# Patient Record
Sex: FEMALE | Race: WHITE | NOT HISPANIC OR LATINO | Employment: PART TIME
[De-identification: names, ages, dates, MRNs, and addresses within clinical notes are randomized per-mention and may not be internally consistent; named-entity substitution may affect disease eponyms.]

---

## 2021-05-13 ENCOUNTER — TRANSCRIBE ORDERS (OUTPATIENT)
Dept: OTHER | Age: 51
End: 2021-05-13

## 2021-05-13 DIAGNOSIS — R74.8 ELEVATED LIVER ENZYMES: Primary | ICD-10-CM

## 2022-08-13 NOTE — H&P (VIEW-ONLY)
GYNECOLOGIC ONCOLOGY CONSULT    Patient Name: DIEGO MARCUS Patient : 1970   Patient MRN: [id number]    Referring Physician: Jian Bolivar MD   Primary GYNOncologist: Ruth Christine (Gynecological/Oncology)  Date of Service: 2022     Reason for Consult:  Treatment recommendations    History of Present Illness (Gyn Oncology):  Diego Marcus is a 52 year old female with a recent history of postmenopausal bleeding who was identified to have an 18 cm solid and cystic right ovarian mass, in addition to severe endometrial thickening. She presents today for treatment recommendations.    Clinical course:  Patient has a history of a left breast cancer (invasive ductal carcinoma) that was treated with partial mastectomy and breast reductions in .  22: Pelvic ultrasound showed highly suspicious right ovarian mass measuring 16.4 x 14 x 10.51 m with abdominal ascites present. Endometrial stripe measures 15 mm.  22: Diego presented for a consult for heavy menstrual bleeding ongoing for approximately 3 months. Her bleeding went on a daily basis for a while. She reported that her regular menstrual cycles ended 2 years prior and has been having random episodes of vaginal bleeding since that time. She has a history of a left breast cancer (invasive ductal carcinoma) that was treated with partial mastectomy and breast reductions.  22:  = 2192.   22: CT scan of the chest, abdomen, and pelvis showed a large 18 cm solid and cystic right ovarian mass, likely cystic ovarian neoplasm. Severe endometrial thickening, either endometrial hyperplasia related to hormonally active ovarian neoplasm versus synchronous malignancy. Small volume ascites. Malignant ascites not excluded. No peritoneal implants identified. No evidence of metastatic disease in the chest.  Genetic Testing (Gyn Oncology):  N/A    Interval History (Gyn Oncology):  Diego presents today for treatment recommendations. She reports her  periods stopped 2 years ago and she started to experience abnormal vaginal bleeding approximately 3-4 months ago. She noted gushing bleeding almost on a daily basis on her pad. Her current vaginal bleeding is intermittent. She also has abdominal bloating. She has bilateral feet and ankle swelling. She has intermittent constipation with her iron supplements. She has a history of liver cirrhosis and has quit drinking since her diagnosis approximately 3 months ago.       Review of Systems:  A complete 14-point review of systems is negative except as noted in the above history of present illness.    Past Medical History:  Menorrhagia  Elevated cholesterol  Left breast cancer (Invasive ductal carcinoma) 2015  Hypertension  Insomnia  Ovarian cyst  Alcoholic cirrhosis of the liver  Pain in joint, involving multiple sites  Tear of rotator cuff  UTI  Rashes due to different topicals    Surgical History:  Partial mastectomy of the left breast 2015  Bilateral breast reductions    x2 in  and   Abdominoplasty    OB/Gyn History:  , C-sections x2, SAB x1  Menarche age: 15  LMP: 2020  Last mammogram on 5/10/2019. Normal  No colonoscopies    Allergies#  No known medication allergies    Medications:  Colace (Docusate Sodium Oral)  Pimecrolimus Topical Cream 1 %  Minocycline Oral 100 mg tablet  Ferrous Sulfate Oral 325 mg (65 mg iron) tablet  Metronidazole Topical Gel 1 %  Multivitamins W-Minerals Oral Tablet  Claritin (Loratadine Oral)  Restoril (Temazepam Oral) 15 mg capsule  Vistaril (Hydroxyzine Pamoate Oral) 25 mg capsule  Family History:  Esophageal cancer - Father,  in late 40s.    Social History:    Former smoker. Only smoked for a couple of years in the s and then quit  Current alcohol use: 3-5 times per week, 2 drinks /episode on average  No drug use    Health Maintenance:    Vital Signs:  Blood pressure: 126/85, Pulse: 106, Temperature: 98.1 F, Respirations: 20, O2 sat:  99%, Pain Scale: 3, Height: 61.5 in, Weight: 148 lb, BSA: 1.67, BMI: 27.51 kg/m2     Physical Exam (Gyn Oncology):  General: Alert and oriented x3, no acute distress.  HEENT: Normocephalic, atraumatic.  Lymph node exam: No supraclavicular, submandibular, or cervical lymphadenopathy.  CV: Regular rate and rhythm, no murmurs, rubs or gallops.  Resp: Clear to auscultation bilaterally, no wheezes, rales, or rhonchi.  Abdomen: Soft non-tender to palpation, significantly distended with a palpable abdominal mass, extending to at least the patient's umbilicus. The mass is relatively immobile.  Lower Extremity Exam: 2+ bilateral pedal edema.  Neuro: Cranial Nerves 2-12 intact, gross motor skills intact.  Genitourinary exam: declined/refused by patient    Laboratory Data:        Imagin22: US Pelvis  IMPRESSION:  Highly suspicious right ovarian mass measuring 16.4 x 14 x 10.51 m with abdominal ascites present. Endometrial stripe measures 15 mm.    22: CT CAP  IMPRESSION:  A large 18 cm solid and cystic right ovarian mass, likely cystic ovarian neoplasm. Severe endometrial thickening, either endometrial hyperplasia related to hormonally active ovarian neoplasm versus synchronous malignancy. Small volume ascites. Malignant ascites not excluded. No peritoneal implants identified. No evidence of metastatic disease in the chest.    Problems:  Primary malignant neoplasm of female breast (disorder) ( Stage Date: 10/12/2015, Stage IIA (Primary, Left breast overlapping sites, T2, pN0, M0, G2, ER Status: Positive, MT Status: Positive)  Date of Dx:2015 Extent of Disease: Evidence of local disease; Histopathologic Type: Ductal invasive carcinoma; Lymph-Vascular Invasion: Not present; Oncotype DX Result-Invasive Breast Cancer: Intermediate risk; Menopausal Status: Premenopausal; First record:10/12/2015 Last record:2016 Other Migrated ICD10: C50.412 , C50.812 )  Adnexal mass  Estrogen receptor positive status [ER+]  ( G1 icd10 w/o problem, last charted: 01-; )  Assessment & Plan (Gyn Oncology):  Radha Marcus is a 52 year old female with a recent history of postmenopausal bleeding who was identified to have an 18 cm solid and cystic right ovarian mass, in addition to severe endometrial thickening. She presents today for treatment recommendations.    1. 18 cm complex right ovarian mass - I reviewed with Radha and her  the concerning features of the right ovarian mass, including the complex features, the associated small volume ascites, and the markedly elevated CA-125 tumor marker greater >2000 u/mL. We reviewed the need to remove the ovarian mass and perform frozen section analysis. With the endometrial thickening listed below and the elevated tumor marker, there is an increased probability that we will uncover an abnormal process at the time of surgery. Therefore, I do think it is unreasonable to proceed with a hysterectomy and bilateral salpingo-oophorectomy. We will potentially be able to proceed robotically, depending on intraoperative findings on diagnostic laparoscopy. We may perform a robotic-assisted total laparoscopic hysterectomy, bilateral salpingo-oophorectomy, and possible staging. We may instead, however, need to proceed with an open procedure due to the sheer size of the mass, the new bilateral pedal edema. I reviewed with Radha the need to not disrupt the cystic mass within the abdomen and the potential need to proceed with a midline laparotomy. We reviewed the risks of surgery that do include bleeding or infection, potential damage to surrounding structures. We reviewed general perioperative expectations and the need for a preoperative history and physical and a COVID-19 test prior to surgery.    2. Endometrial thickening - Radha's endometrial lining was 15 mm on the initial ultrasound and with her recent history of abnormal uterine bleeding, I have recommended we proceed with an endometrial  biopsy. Radha has a great fear of pain and is not comfortable undergoing an endometrial biopsy in the office today. Due to the relatively urgent nature of the procedure, I have recommended instead that we proceed with a hysterectomy and possible staging, depending on intraoperative findings.    3. History of cirrhosis - Radha is now 3 months clean for her history of alcoholism. We will need to obtain labs to determine her albumin level, her liver function test to know her general perioperative risks. Preoperative risk assessment and management is necessary.     Thank you very much for allowing me to take part in the care of this very sweet patient.    3 minutes in reviewing records, 40 minutes in discussion, 2 minutes ordering labs.    Pain Care Management:  Pain Scale: 3    Patient Care needs:  Depressions Status: Was screened; Outcome positive: No; Screening Date: 08/11/2022; Screening Tool: PRIME MD-PHQ2; Total depression score: 0  Smoking Status: Former smoker  Documentation assistance provided by Brad Suárez, scribing for Dr. Ruth Christine, on 8/11/2022.  I, Dr. Ruth Christine, personally performed the services described in this documentation, and it is both accurate and complete.     Ruth Christine MD  Phone: [phone]  Fax: [phone]

## 2022-08-13 NOTE — CONSULTS
GYNECOLOGIC ONCOLOGY CONSULT    Patient Name: DIEGO MARCUS Patient : 1970   Patient MRN: [id number]    Referring Physician: Jian Bolivar MD   Primary GYNOncologist: Ruth Christine (Gynecological/Oncology)  Date of Service: 2022     Reason for Consult:  Treatment recommendations    History of Present Illness (Gyn Oncology):  Diego Marcus is a 52 year old female with a recent history of postmenopausal bleeding who was identified to have an 18 cm solid and cystic right ovarian mass, in addition to severe endometrial thickening. She presents today for treatment recommendations.    Clinical course:  Patient has a history of a left breast cancer (invasive ductal carcinoma) that was treated with partial mastectomy and breast reductions in .  22: Pelvic ultrasound showed highly suspicious right ovarian mass measuring 16.4 x 14 x 10.51 m with abdominal ascites present. Endometrial stripe measures 15 mm.  22: Diego presented for a consult for heavy menstrual bleeding ongoing for approximately 3 months. Her bleeding went on a daily basis for a while. She reported that her regular menstrual cycles ended 2 years prior and has been having random episodes of vaginal bleeding since that time. She has a history of a left breast cancer (invasive ductal carcinoma) that was treated with partial mastectomy and breast reductions.  22:  = 2192.   22: CT scan of the chest, abdomen, and pelvis showed a large 18 cm solid and cystic right ovarian mass, likely cystic ovarian neoplasm. Severe endometrial thickening, either endometrial hyperplasia related to hormonally active ovarian neoplasm versus synchronous malignancy. Small volume ascites. Malignant ascites not excluded. No peritoneal implants identified. No evidence of metastatic disease in the chest.  Genetic Testing (Gyn Oncology):  N/A    Interval History (Gyn Oncology):  Diego presents today for treatment recommendations. She reports her  periods stopped 2 years ago and she started to experience abnormal vaginal bleeding approximately 3-4 months ago. She noted gushing bleeding almost on a daily basis on her pad. Her current vaginal bleeding is intermittent. She also has abdominal bloating. She has bilateral feet and ankle swelling. She has intermittent constipation with her iron supplements. She has a history of liver cirrhosis and has quit drinking since her diagnosis approximately 3 months ago.       Review of Systems:  A complete 14-point review of systems is negative except as noted in the above history of present illness.    Past Medical History:  Menorrhagia  Elevated cholesterol  Left breast cancer (Invasive ductal carcinoma) 2015  Hypertension  Insomnia  Ovarian cyst  Alcoholic cirrhosis of the liver  Pain in joint, involving multiple sites  Tear of rotator cuff  UTI  Rashes due to different topicals    Surgical History:  Partial mastectomy of the left breast 2015  Bilateral breast reductions    x2 in  and   Abdominoplasty    OB/Gyn History:  , C-sections x2, SAB x1  Menarche age: 15  LMP: 2020  Last mammogram on 5/10/2019. Normal  No colonoscopies    Allergies#  No known medication allergies    Medications:  Colace (Docusate Sodium Oral)  Pimecrolimus Topical Cream 1 %  Minocycline Oral 100 mg tablet  Ferrous Sulfate Oral 325 mg (65 mg iron) tablet  Metronidazole Topical Gel 1 %  Multivitamins W-Minerals Oral Tablet  Claritin (Loratadine Oral)  Restoril (Temazepam Oral) 15 mg capsule  Vistaril (Hydroxyzine Pamoate Oral) 25 mg capsule  Family History:  Esophageal cancer - Father,  in late 40s.    Social History:    Former smoker. Only smoked for a couple of years in the s and then quit  Current alcohol use: 3-5 times per week, 2 drinks /episode on average  No drug use    Health Maintenance:    Vital Signs:  Blood pressure: 126/85, Pulse: 106, Temperature: 98.1 F, Respirations: 20, O2 sat:  99%, Pain Scale: 3, Height: 61.5 in, Weight: 148 lb, BSA: 1.67, BMI: 27.51 kg/m2     Physical Exam (Gyn Oncology):  General: Alert and oriented x3, no acute distress.  HEENT: Normocephalic, atraumatic.  Lymph node exam: No supraclavicular, submandibular, or cervical lymphadenopathy.  CV: Regular rate and rhythm, no murmurs, rubs or gallops.  Resp: Clear to auscultation bilaterally, no wheezes, rales, or rhonchi.  Abdomen: Soft non-tender to palpation, significantly distended with a palpable abdominal mass, extending to at least the patient's umbilicus. The mass is relatively immobile.  Lower Extremity Exam: 2+ bilateral pedal edema.  Neuro: Cranial Nerves 2-12 intact, gross motor skills intact.  Genitourinary exam: declined/refused by patient    Laboratory Data:        Imagin22: US Pelvis  IMPRESSION:  Highly suspicious right ovarian mass measuring 16.4 x 14 x 10.51 m with abdominal ascites present. Endometrial stripe measures 15 mm.    22: CT CAP  IMPRESSION:  A large 18 cm solid and cystic right ovarian mass, likely cystic ovarian neoplasm. Severe endometrial thickening, either endometrial hyperplasia related to hormonally active ovarian neoplasm versus synchronous malignancy. Small volume ascites. Malignant ascites not excluded. No peritoneal implants identified. No evidence of metastatic disease in the chest.    Problems:  Primary malignant neoplasm of female breast (disorder) ( Stage Date: 10/12/2015, Stage IIA (Primary, Left breast overlapping sites, T2, pN0, M0, G2, ER Status: Positive, MT Status: Positive)  Date of Dx:2015 Extent of Disease: Evidence of local disease; Histopathologic Type: Ductal invasive carcinoma; Lymph-Vascular Invasion: Not present; Oncotype DX Result-Invasive Breast Cancer: Intermediate risk; Menopausal Status: Premenopausal; First record:10/12/2015 Last record:2016 Other Migrated ICD10: C50.412 , C50.812 )  Adnexal mass  Estrogen receptor positive status [ER+]  ( G1 icd10 w/o problem, last charted: 01-; )  Assessment & Plan (Gyn Oncology):  Radha Marcus is a 52 year old female with a recent history of postmenopausal bleeding who was identified to have an 18 cm solid and cystic right ovarian mass, in addition to severe endometrial thickening. She presents today for treatment recommendations.    1. 18 cm complex right ovarian mass - I reviewed with Radha and her  the concerning features of the right ovarian mass, including the complex features, the associated small volume ascites, and the markedly elevated CA-125 tumor marker greater >2000 u/mL. We reviewed the need to remove the ovarian mass and perform frozen section analysis. With the endometrial thickening listed below and the elevated tumor marker, there is an increased probability that we will uncover an abnormal process at the time of surgery. Therefore, I do think it is unreasonable to proceed with a hysterectomy and bilateral salpingo-oophorectomy. We will potentially be able to proceed robotically, depending on intraoperative findings on diagnostic laparoscopy. We may perform a robotic-assisted total laparoscopic hysterectomy, bilateral salpingo-oophorectomy, and possible staging. We may instead, however, need to proceed with an open procedure due to the sheer size of the mass, the new bilateral pedal edema. I reviewed with Radha the need to not disrupt the cystic mass within the abdomen and the potential need to proceed with a midline laparotomy. We reviewed the risks of surgery that do include bleeding or infection, potential damage to surrounding structures. We reviewed general perioperative expectations and the need for a preoperative history and physical and a COVID-19 test prior to surgery.    2. Endometrial thickening - Radha's endometrial lining was 15 mm on the initial ultrasound and with her recent history of abnormal uterine bleeding, I have recommended we proceed with an endometrial  biopsy. Radha has a great fear of pain and is not comfortable undergoing an endometrial biopsy in the office today. Due to the relatively urgent nature of the procedure, I have recommended instead that we proceed with a hysterectomy and possible staging, depending on intraoperative findings.    3. History of cirrhosis - Radha is now 3 months clean for her history of alcoholism. We will need to obtain labs to determine her albumin level, her liver function test to know her general perioperative risks. Preoperative risk assessment and management is necessary.     Thank you very much for allowing me to take part in the care of this very sweet patient.    3 minutes in reviewing records, 40 minutes in discussion, 2 minutes ordering labs.    Pain Care Management:  Pain Scale: 3    Patient Care needs:  Depressions Status: Was screened; Outcome positive: No; Screening Date: 08/11/2022; Screening Tool: PRIME MD-PHQ2; Total depression score: 0  Smoking Status: Former smoker  Documentation assistance provided by Brad Suárez, scribing for Dr. Ruth Christine, on 8/11/2022.  I, Dr. Ruth Christine, personally performed the services described in this documentation, and it is both accurate and complete.     Ruth Christine MD  Phone: [phone]  Fax: [phone]

## 2022-08-15 RX ORDER — MINOCYCLINE HYDROCHLORIDE 100 MG/1
100 CAPSULE ORAL 2 TIMES DAILY
COMMUNITY

## 2022-08-15 RX ORDER — HYDROXYZINE HYDROCHLORIDE 25 MG/1
25 TABLET, FILM COATED ORAL 3 TIMES DAILY PRN
Status: ON HOLD | COMMUNITY
End: 2022-08-16

## 2022-08-15 RX ORDER — HYDROCODONE BITARTRATE AND ACETAMINOPHEN 5; 325 MG/1; MG/1
1 TABLET ORAL EVERY 6 HOURS PRN
Status: ON HOLD | COMMUNITY
End: 2022-08-16

## 2022-08-15 RX ORDER — FERROUS SULFATE 324(65)MG
TABLET, DELAYED RELEASE (ENTERIC COATED) ORAL
Status: ON HOLD | COMMUNITY
End: 2022-08-16 | Stop reason: DRUGHIGH

## 2022-08-16 ENCOUNTER — ANCILLARY PROCEDURE (OUTPATIENT)
Dept: ULTRASOUND IMAGING | Facility: HOSPITAL | Age: 52
End: 2022-08-16
Attending: ANESTHESIOLOGY
Payer: COMMERCIAL

## 2022-08-16 ENCOUNTER — ANESTHESIA EVENT (OUTPATIENT)
Dept: SURGERY | Facility: HOSPITAL | Age: 52
End: 2022-08-16
Payer: COMMERCIAL

## 2022-08-16 ENCOUNTER — ANESTHESIA (OUTPATIENT)
Dept: SURGERY | Facility: HOSPITAL | Age: 52
End: 2022-08-16
Payer: COMMERCIAL

## 2022-08-16 ENCOUNTER — HOSPITAL ENCOUNTER (OUTPATIENT)
Facility: HOSPITAL | Age: 52
Discharge: HOME | End: 2022-08-18
Attending: OBSTETRICS & GYNECOLOGY | Admitting: OBSTETRICS & GYNECOLOGY
Payer: COMMERCIAL

## 2022-08-16 DIAGNOSIS — R19.00 PELVIC MASS: Primary | ICD-10-CM

## 2022-08-16 LAB
HCG SERPL-ACNC: <2 MLU/ML (ref 0–4)
HGB BLD-MCNC: 9.4 G/DL (ref 11.7–15.7)

## 2022-08-16 PROCEDURE — 360N000080 HC SURGERY LEVEL 7, PER MIN: Performed by: OBSTETRICS & GYNECOLOGY

## 2022-08-16 PROCEDURE — 258N000003 HC RX IP 258 OP 636: Performed by: PHYSICIAN ASSISTANT

## 2022-08-16 PROCEDURE — 258N000003 HC RX IP 258 OP 636: Performed by: NURSE ANESTHETIST, CERTIFIED REGISTERED

## 2022-08-16 PROCEDURE — 250N000011 HC RX IP 250 OP 636: Performed by: NURSE ANESTHETIST, CERTIFIED REGISTERED

## 2022-08-16 PROCEDURE — 258N000003 HC RX IP 258 OP 636: Performed by: ANESTHESIOLOGY

## 2022-08-16 PROCEDURE — 88305 TISSUE EXAM BY PATHOLOGIST: CPT | Mod: 26 | Performed by: PATHOLOGY

## 2022-08-16 PROCEDURE — 88341 IMHCHEM/IMCYTCHM EA ADD ANTB: CPT | Mod: 26 | Performed by: PATHOLOGY

## 2022-08-16 PROCEDURE — 250N000025 HC SEVOFLURANE, PER MIN: Performed by: OBSTETRICS & GYNECOLOGY

## 2022-08-16 PROCEDURE — 250N000009 HC RX 250: Performed by: ANESTHESIOLOGY

## 2022-08-16 PROCEDURE — 999N000141 HC STATISTIC PRE-PROCEDURE NURSING ASSESSMENT: Performed by: OBSTETRICS & GYNECOLOGY

## 2022-08-16 PROCEDURE — 84702 CHORIONIC GONADOTROPIN TEST: CPT | Performed by: PHYSICIAN ASSISTANT

## 2022-08-16 PROCEDURE — 250N000011 HC RX IP 250 OP 636

## 2022-08-16 PROCEDURE — 250N000009 HC RX 250: Performed by: NURSE ANESTHETIST, CERTIFIED REGISTERED

## 2022-08-16 PROCEDURE — 88331 PATH CONSLTJ SURG 1 BLK 1SPC: CPT | Mod: TC | Performed by: OBSTETRICS & GYNECOLOGY

## 2022-08-16 PROCEDURE — 710N000009 HC RECOVERY PHASE 1, LEVEL 1, PER MIN: Performed by: OBSTETRICS & GYNECOLOGY

## 2022-08-16 PROCEDURE — 272N000001 HC OR GENERAL SUPPLY STERILE: Performed by: OBSTETRICS & GYNECOLOGY

## 2022-08-16 PROCEDURE — 36415 COLL VENOUS BLD VENIPUNCTURE: CPT | Performed by: PHYSICIAN ASSISTANT

## 2022-08-16 PROCEDURE — 88305 TISSUE EXAM BY PATHOLOGIST: CPT | Mod: TC | Performed by: OBSTETRICS & GYNECOLOGY

## 2022-08-16 PROCEDURE — 88309 TISSUE EXAM BY PATHOLOGIST: CPT | Mod: 26 | Performed by: PATHOLOGY

## 2022-08-16 PROCEDURE — 370N000017 HC ANESTHESIA TECHNICAL FEE, PER MIN: Performed by: OBSTETRICS & GYNECOLOGY

## 2022-08-16 PROCEDURE — 250N000009 HC RX 250: Performed by: OBSTETRICS & GYNECOLOGY

## 2022-08-16 PROCEDURE — 88112 CYTOPATH CELL ENHANCE TECH: CPT | Mod: 26 | Performed by: PATHOLOGY

## 2022-08-16 PROCEDURE — 88331 PATH CONSLTJ SURG 1 BLK 1SPC: CPT | Mod: 26 | Performed by: PATHOLOGY

## 2022-08-16 PROCEDURE — 85018 HEMOGLOBIN: CPT | Performed by: PHYSICIAN ASSISTANT

## 2022-08-16 PROCEDURE — 88342 IMHCHEM/IMCYTCHM 1ST ANTB: CPT | Mod: 26 | Performed by: PATHOLOGY

## 2022-08-16 PROCEDURE — P9045 ALBUMIN (HUMAN), 5%, 250 ML: HCPCS | Performed by: NURSE ANESTHETIST, CERTIFIED REGISTERED

## 2022-08-16 PROCEDURE — 250N000011 HC RX IP 250 OP 636: Performed by: PHYSICIAN ASSISTANT

## 2022-08-16 PROCEDURE — 250N000009 HC RX 250

## 2022-08-16 PROCEDURE — 250N000011 HC RX IP 250 OP 636: Performed by: ANESTHESIOLOGY

## 2022-08-16 RX ORDER — FENTANYL CITRATE 50 UG/ML
50 INJECTION, SOLUTION INTRAMUSCULAR; INTRAVENOUS EVERY 5 MIN PRN
Status: DISCONTINUED | OUTPATIENT
Start: 2022-08-16 | End: 2022-08-16 | Stop reason: HOSPADM

## 2022-08-16 RX ORDER — TEMAZEPAM 15 MG/1
15 CAPSULE ORAL
Status: DISCONTINUED | OUTPATIENT
Start: 2022-08-16 | End: 2022-08-18 | Stop reason: HOSPADM

## 2022-08-16 RX ORDER — DEXAMETHASONE SODIUM PHOSPHATE 10 MG/ML
INJECTION, SOLUTION INTRAMUSCULAR; INTRAVENOUS PRN
Status: DISCONTINUED | OUTPATIENT
Start: 2022-08-16 | End: 2022-08-16

## 2022-08-16 RX ORDER — FERROUS SULFATE 325(65) MG
325 TABLET, DELAYED RELEASE (ENTERIC COATED) ORAL 2 TIMES DAILY
COMMUNITY
Start: 2022-07-29

## 2022-08-16 RX ORDER — HYDROXYZINE PAMOATE 25 MG/1
25-50 CAPSULE ORAL 4 TIMES DAILY PRN
COMMUNITY
Start: 2021-05-20

## 2022-08-16 RX ORDER — NALOXONE HYDROCHLORIDE 0.4 MG/ML
0.2 INJECTION, SOLUTION INTRAMUSCULAR; INTRAVENOUS; SUBCUTANEOUS
Status: DISCONTINUED | OUTPATIENT
Start: 2022-08-16 | End: 2022-08-18 | Stop reason: HOSPADM

## 2022-08-16 RX ORDER — EPHEDRINE SULFATE 50 MG/ML
INJECTION, SOLUTION INTRAMUSCULAR; INTRAVENOUS; SUBCUTANEOUS PRN
Status: DISCONTINUED | OUTPATIENT
Start: 2022-08-16 | End: 2022-08-16

## 2022-08-16 RX ORDER — NALOXONE HYDROCHLORIDE 0.4 MG/ML
0.4 INJECTION, SOLUTION INTRAMUSCULAR; INTRAVENOUS; SUBCUTANEOUS
Status: DISCONTINUED | OUTPATIENT
Start: 2022-08-16 | End: 2022-08-18 | Stop reason: HOSPADM

## 2022-08-16 RX ORDER — HYDROMORPHONE HYDROCHLORIDE 1 MG/ML
0.2 INJECTION, SOLUTION INTRAMUSCULAR; INTRAVENOUS; SUBCUTANEOUS
Status: DISCONTINUED | OUTPATIENT
Start: 2022-08-16 | End: 2022-08-18 | Stop reason: HOSPADM

## 2022-08-16 RX ORDER — LIDOCAINE HYDROCHLORIDE 10 MG/ML
INJECTION, SOLUTION INFILTRATION; PERINEURAL PRN
Status: DISCONTINUED | OUTPATIENT
Start: 2022-08-16 | End: 2022-08-16

## 2022-08-16 RX ORDER — SODIUM CHLORIDE 9 MG/ML
INJECTION, SOLUTION INTRAVENOUS CONTINUOUS
Status: DISCONTINUED | OUTPATIENT
Start: 2022-08-16 | End: 2022-08-18 | Stop reason: HOSPADM

## 2022-08-16 RX ORDER — SODIUM CHLORIDE, SODIUM LACTATE, POTASSIUM CHLORIDE, CALCIUM CHLORIDE 600; 310; 30; 20 MG/100ML; MG/100ML; MG/100ML; MG/100ML
INJECTION, SOLUTION INTRAVENOUS CONTINUOUS
Status: DISCONTINUED | OUTPATIENT
Start: 2022-08-16 | End: 2022-08-16 | Stop reason: HOSPADM

## 2022-08-16 RX ORDER — KETAMINE HYDROCHLORIDE 10 MG/ML
INJECTION INTRAMUSCULAR; INTRAVENOUS PRN
Status: DISCONTINUED | OUTPATIENT
Start: 2022-08-16 | End: 2022-08-16

## 2022-08-16 RX ORDER — HYDROMORPHONE HYDROCHLORIDE 1 MG/ML
0.4 INJECTION, SOLUTION INTRAMUSCULAR; INTRAVENOUS; SUBCUTANEOUS
Status: DISCONTINUED | OUTPATIENT
Start: 2022-08-16 | End: 2022-08-18 | Stop reason: HOSPADM

## 2022-08-16 RX ORDER — OXYCODONE HYDROCHLORIDE 5 MG/1
5 TABLET ORAL EVERY 4 HOURS PRN
Status: DISCONTINUED | OUTPATIENT
Start: 2022-08-16 | End: 2022-08-16 | Stop reason: HOSPADM

## 2022-08-16 RX ORDER — LIDOCAINE 40 MG/G
CREAM TOPICAL
Status: DISCONTINUED | OUTPATIENT
Start: 2022-08-16 | End: 2022-08-18 | Stop reason: HOSPADM

## 2022-08-16 RX ORDER — SCOLOPAMINE TRANSDERMAL SYSTEM 1 MG/1
1 PATCH, EXTENDED RELEASE TRANSDERMAL ONCE
Status: COMPLETED | OUTPATIENT
Start: 2022-08-16 | End: 2022-08-17

## 2022-08-16 RX ORDER — ONDANSETRON 2 MG/ML
INJECTION INTRAMUSCULAR; INTRAVENOUS PRN
Status: DISCONTINUED | OUTPATIENT
Start: 2022-08-16 | End: 2022-08-16

## 2022-08-16 RX ORDER — FENTANYL CITRATE 50 UG/ML
50 INJECTION, SOLUTION INTRAMUSCULAR; INTRAVENOUS
Status: DISCONTINUED | OUTPATIENT
Start: 2022-08-16 | End: 2022-08-16 | Stop reason: HOSPADM

## 2022-08-16 RX ORDER — IBUPROFEN 200 MG
600 TABLET ORAL EVERY 6 HOURS PRN
Status: CANCELLED | OUTPATIENT
Start: 2022-08-16

## 2022-08-16 RX ORDER — PROPOFOL 10 MG/ML
INJECTION, EMULSION INTRAVENOUS CONTINUOUS PRN
Status: DISCONTINUED | OUTPATIENT
Start: 2022-08-16 | End: 2022-08-16

## 2022-08-16 RX ORDER — ONDANSETRON 2 MG/ML
4 INJECTION INTRAMUSCULAR; INTRAVENOUS EVERY 6 HOURS PRN
Status: DISCONTINUED | OUTPATIENT
Start: 2022-08-16 | End: 2022-08-18 | Stop reason: HOSPADM

## 2022-08-16 RX ORDER — DEXAMETHASONE SODIUM PHOSPHATE 4 MG/ML
INJECTION, SOLUTION INTRA-ARTICULAR; INTRALESIONAL; INTRAMUSCULAR; INTRAVENOUS; SOFT TISSUE PRN
Status: DISCONTINUED | OUTPATIENT
Start: 2022-08-16 | End: 2022-08-16

## 2022-08-16 RX ORDER — ALBUTEROL SULFATE 90 UG/1
2 AEROSOL, METERED RESPIRATORY (INHALATION) EVERY 6 HOURS PRN
Status: DISCONTINUED | OUTPATIENT
Start: 2022-08-16 | End: 2022-08-18 | Stop reason: HOSPADM

## 2022-08-16 RX ORDER — OXYCODONE HYDROCHLORIDE 5 MG/1
5 TABLET ORAL EVERY 4 HOURS PRN
Status: DISCONTINUED | OUTPATIENT
Start: 2022-08-16 | End: 2022-08-18 | Stop reason: HOSPADM

## 2022-08-16 RX ORDER — HYDROXYZINE PAMOATE 25 MG/1
25-50 CAPSULE ORAL 4 TIMES DAILY PRN
Status: DISCONTINUED | OUTPATIENT
Start: 2022-08-16 | End: 2022-08-17 | Stop reason: CLARIF

## 2022-08-16 RX ORDER — HYDROXYZINE HYDROCHLORIDE 25 MG/1
25 TABLET, FILM COATED ORAL EVERY 6 HOURS PRN
Status: DISCONTINUED | OUTPATIENT
Start: 2022-08-16 | End: 2022-08-17

## 2022-08-16 RX ORDER — ONDANSETRON 2 MG/ML
4 INJECTION INTRAMUSCULAR; INTRAVENOUS EVERY 30 MIN PRN
Status: DISCONTINUED | OUTPATIENT
Start: 2022-08-16 | End: 2022-08-16 | Stop reason: HOSPADM

## 2022-08-16 RX ORDER — MAGNESIUM SULFATE 4 G/50ML
4 INJECTION INTRAVENOUS ONCE
Status: COMPLETED | OUTPATIENT
Start: 2022-08-16 | End: 2022-08-16

## 2022-08-16 RX ORDER — ONDANSETRON 4 MG/1
4 TABLET, ORALLY DISINTEGRATING ORAL EVERY 30 MIN PRN
Status: DISCONTINUED | OUTPATIENT
Start: 2022-08-16 | End: 2022-08-16 | Stop reason: HOSPADM

## 2022-08-16 RX ORDER — PROCHLORPERAZINE MALEATE 10 MG
10 TABLET ORAL EVERY 6 HOURS PRN
Status: DISCONTINUED | OUTPATIENT
Start: 2022-08-16 | End: 2022-08-18 | Stop reason: HOSPADM

## 2022-08-16 RX ORDER — BUPIVACAINE HYDROCHLORIDE 5 MG/ML
INJECTION, SOLUTION PERINEURAL PRN
Status: DISCONTINUED | OUTPATIENT
Start: 2022-08-16 | End: 2022-08-16 | Stop reason: HOSPADM

## 2022-08-16 RX ORDER — FENTANYL CITRATE 50 UG/ML
INJECTION, SOLUTION INTRAMUSCULAR; INTRAVENOUS PRN
Status: DISCONTINUED | OUTPATIENT
Start: 2022-08-16 | End: 2022-08-16

## 2022-08-16 RX ORDER — OXYCODONE HYDROCHLORIDE 5 MG/1
10 TABLET ORAL EVERY 4 HOURS PRN
Status: DISCONTINUED | OUTPATIENT
Start: 2022-08-16 | End: 2022-08-18 | Stop reason: HOSPADM

## 2022-08-16 RX ORDER — CEFAZOLIN SODIUM/WATER 2 G/20 ML
2 SYRINGE (ML) INTRAVENOUS SEE ADMIN INSTRUCTIONS
Status: DISCONTINUED | OUTPATIENT
Start: 2022-08-16 | End: 2022-08-16 | Stop reason: HOSPADM

## 2022-08-16 RX ORDER — CEFAZOLIN SODIUM/WATER 2 G/20 ML
2 SYRINGE (ML) INTRAVENOUS
Status: COMPLETED | OUTPATIENT
Start: 2022-08-16 | End: 2022-08-16

## 2022-08-16 RX ORDER — LIDOCAINE 40 MG/G
CREAM TOPICAL
Status: DISCONTINUED | OUTPATIENT
Start: 2022-08-16 | End: 2022-08-16 | Stop reason: HOSPADM

## 2022-08-16 RX ORDER — ONDANSETRON 4 MG/1
4 TABLET, ORALLY DISINTEGRATING ORAL EVERY 6 HOURS PRN
Status: DISCONTINUED | OUTPATIENT
Start: 2022-08-16 | End: 2022-08-18 | Stop reason: HOSPADM

## 2022-08-16 RX ORDER — PROPOFOL 10 MG/ML
INJECTION, EMULSION INTRAVENOUS PRN
Status: DISCONTINUED | OUTPATIENT
Start: 2022-08-16 | End: 2022-08-16

## 2022-08-16 RX ADMIN — HYDROMORPHONE HYDROCHLORIDE 0.2 MG: 1 INJECTION, SOLUTION INTRAMUSCULAR; INTRAVENOUS; SUBCUTANEOUS at 20:14

## 2022-08-16 RX ADMIN — Medication 2 G: at 17:05

## 2022-08-16 RX ADMIN — ALBUMIN HUMAN: 0.05 INJECTION, SOLUTION INTRAVENOUS at 18:06

## 2022-08-16 RX ADMIN — PHENYLEPHRINE HYDROCHLORIDE 200 MCG: 10 INJECTION INTRAVENOUS at 17:36

## 2022-08-16 RX ADMIN — FENTANYL CITRATE 50 MCG: 50 INJECTION, SOLUTION INTRAMUSCULAR; INTRAVENOUS at 19:23

## 2022-08-16 RX ADMIN — SODIUM CHLORIDE, POTASSIUM CHLORIDE, SODIUM LACTATE AND CALCIUM CHLORIDE 250 ML: 600; 310; 30; 20 INJECTION, SOLUTION INTRAVENOUS at 14:59

## 2022-08-16 RX ADMIN — FENTANYL CITRATE 50 MCG: 50 INJECTION, SOLUTION INTRAMUSCULAR; INTRAVENOUS at 17:04

## 2022-08-16 RX ADMIN — FENTANYL CITRATE 50 MCG: 50 INJECTION, SOLUTION INTRAMUSCULAR; INTRAVENOUS at 17:06

## 2022-08-16 RX ADMIN — ROCURONIUM BROMIDE 50 MG: 50 INJECTION, SOLUTION INTRAVENOUS at 17:09

## 2022-08-16 RX ADMIN — ROCURONIUM BROMIDE 30 MG: 50 INJECTION, SOLUTION INTRAVENOUS at 17:28

## 2022-08-16 RX ADMIN — KETAMINE HYDROCHLORIDE 50 MG: 10 INJECTION, SOLUTION INTRAMUSCULAR; INTRAVENOUS at 17:09

## 2022-08-16 RX ADMIN — FENTANYL CITRATE 50 MCG: 50 INJECTION, SOLUTION INTRAMUSCULAR; INTRAVENOUS at 19:18

## 2022-08-16 RX ADMIN — PHENYLEPHRINE HYDROCHLORIDE 100 MCG: 10 INJECTION INTRAVENOUS at 18:07

## 2022-08-16 RX ADMIN — PROPOFOL 25 MCG/KG/MIN: 10 INJECTION, EMULSION INTRAVENOUS at 17:15

## 2022-08-16 RX ADMIN — SUGAMMADEX 200 MG: 100 INJECTION, SOLUTION INTRAVENOUS at 18:49

## 2022-08-16 RX ADMIN — ONDANSETRON 4 MG: 2 INJECTION INTRAMUSCULAR; INTRAVENOUS at 18:44

## 2022-08-16 RX ADMIN — PHENYLEPHRINE HYDROCHLORIDE 100 MCG: 10 INJECTION INTRAVENOUS at 17:25

## 2022-08-16 RX ADMIN — MAGNESIUM SULFATE HEPTAHYDRATE 4 G: 80 INJECTION, SOLUTION INTRAVENOUS at 15:16

## 2022-08-16 RX ADMIN — FENTANYL CITRATE 50 MCG: 50 INJECTION, SOLUTION INTRAMUSCULAR; INTRAVENOUS at 19:50

## 2022-08-16 RX ADMIN — MIDAZOLAM 2 MG: 1 INJECTION INTRAMUSCULAR; INTRAVENOUS at 17:00

## 2022-08-16 RX ADMIN — FENTANYL CITRATE 50 MCG: 50 INJECTION, SOLUTION INTRAMUSCULAR; INTRAVENOUS at 19:29

## 2022-08-16 RX ADMIN — Medication 10 MG: at 18:18

## 2022-08-16 RX ADMIN — SODIUM CHLORIDE, POTASSIUM CHLORIDE, SODIUM LACTATE AND CALCIUM CHLORIDE: 600; 310; 30; 20 INJECTION, SOLUTION INTRAVENOUS at 15:42

## 2022-08-16 RX ADMIN — SODIUM CHLORIDE: 9 INJECTION, SOLUTION INTRAVENOUS at 21:50

## 2022-08-16 RX ADMIN — PHENYLEPHRINE HYDROCHLORIDE 100 MCG: 10 INJECTION INTRAVENOUS at 18:09

## 2022-08-16 RX ADMIN — PHENYLEPHRINE HYDROCHLORIDE 100 MCG: 10 INJECTION INTRAVENOUS at 17:24

## 2022-08-16 RX ADMIN — SCOPALAMINE 1 PATCH: 1 PATCH, EXTENDED RELEASE TRANSDERMAL at 15:17

## 2022-08-16 RX ADMIN — Medication 5 MG: at 18:16

## 2022-08-16 RX ADMIN — PHENYLEPHRINE HYDROCHLORIDE 200 MCG: 10 INJECTION INTRAVENOUS at 18:12

## 2022-08-16 RX ADMIN — DEXAMETHASONE SODIUM PHOSPHATE 10 MG: 10 INJECTION, SOLUTION INTRAMUSCULAR; INTRAVENOUS at 17:30

## 2022-08-16 RX ADMIN — HYDROMORPHONE HYDROCHLORIDE 0.2 MG: 1 INJECTION, SOLUTION INTRAMUSCULAR; INTRAVENOUS; SUBCUTANEOUS at 20:21

## 2022-08-16 RX ADMIN — ALBUMIN HUMAN: 0.05 INJECTION, SOLUTION INTRAVENOUS at 17:39

## 2022-08-16 RX ADMIN — PHENYLEPHRINE HYDROCHLORIDE 100 MCG: 10 INJECTION INTRAVENOUS at 17:43

## 2022-08-16 RX ADMIN — PHENYLEPHRINE HYDROCHLORIDE 100 MCG: 10 INJECTION INTRAVENOUS at 17:58

## 2022-08-16 RX ADMIN — Medication 10 MG: at 18:44

## 2022-08-16 RX ADMIN — PROPOFOL 130 MG: 10 INJECTION, EMULSION INTRAVENOUS at 17:09

## 2022-08-16 RX ADMIN — ROCURONIUM BROMIDE 20 MG: 50 INJECTION, SOLUTION INTRAVENOUS at 17:36

## 2022-08-16 RX ADMIN — PHENYLEPHRINE HYDROCHLORIDE 100 MCG: 10 INJECTION INTRAVENOUS at 18:18

## 2022-08-16 RX ADMIN — SODIUM CHLORIDE, POTASSIUM CHLORIDE, SODIUM LACTATE AND CALCIUM CHLORIDE: 600; 310; 30; 20 INJECTION, SOLUTION INTRAVENOUS at 18:29

## 2022-08-16 RX ADMIN — LIDOCAINE HYDROCHLORIDE 30 MG: 10 INJECTION, SOLUTION INFILTRATION; PERINEURAL at 17:09

## 2022-08-16 ASSESSMENT — ACTIVITIES OF DAILY LIVING (ADL)
ADLS_ACUITY_SCORE: 35
ADLS_ACUITY_SCORE: 35
ADLS_ACUITY_SCORE: 37
ADLS_ACUITY_SCORE: 35
ADLS_ACUITY_SCORE: 35

## 2022-08-16 NOTE — ANESTHESIA PREPROCEDURE EVALUATION
Anesthesia Pre-Procedure Evaluation    Patient: Radha Marcus   MRN: [id number] : 1970        Procedure : Procedure(s):  DIAGNOSTIC LAPAROSCOPY  POSSIBLE BILATERAL SALPINGO-OOPHORECTOMY, POSSIBLE STAGING, POSSIBLE OPEN  POSSIBLE ROBOTIC ASSISTED TOTAL HYSTERECTOMY          Past Medical History:   Diagnosis Date     Alcohol dependence (H)      Alcoholic cirrhosis of liver (H)      Anemia      Breast cancer (H)      HTN (hypertension)      Insomnia      Obesity      Ovarian cyst       Past Surgical History:   Procedure Laterality Date     ABDOMINOPLASTY        SECTION   &      LUMPECTOMY BREAST Left 2015     REVISE RECONSTRUCTED BREAST Left 2015    reduction post-lumpectomy along with tummy tuck.       No Known Allergies   Social History     Tobacco Use     Smoking status: Former Smoker     Smokeless tobacco: Never Used   Substance Use Topics     Alcohol use: Not Currently     Comment: no alcohol for 118 days      Wt Readings from Last 1 Encounters:   11/11/15 63.4 kg (139 lb 11.2 oz)        Anesthesia Evaluation   Pt has had prior anesthetic.     No history of anesthetic complications       ROS/MED HX  ENT/Pulmonary:       Neurologic:       Cardiovascular:     (+) hypertension-----    METS/Exercise Tolerance:     Hematologic:       Musculoskeletal:       GI/Hepatic:     (+) liver disease,     Renal/Genitourinary:       Endo:     (+) Obesity,     Psychiatric/Substance Use:       Infectious Disease:       Malignancy:       Other:            Physical Exam    Airway        Mallampati: II    Neck ROM: full     Respiratory Devices and Support         Dental  no notable dental history         Cardiovascular   cardiovascular exam normal          Pulmonary   pulmonary exam normal                OUTSIDE LABS:  CBC:   Lab Results   Component Value Date    HGB 9.4 (L) 2022     BMP: No results found for: NA, POTASSIUM, CHLORIDE, CO2, BUN, CR, GLC  COAGS: No results found for: PTT, INR,  FIBR  POC: No results found for: BGM, HCG, HCGS  HEPATIC: No results found for: ALBUMIN, PROTTOTAL, ALT, AST, GGT, ALKPHOS, BILITOTAL, BILIDIRECT, GALE  OTHER: No results found for: PH, LACT, A1C, LUIS, PHOS, MAG, LIPASE, AMYLASE, TSH, T4, T3, CRP, SED    Anesthesia Plan    ASA Status:  2      Anesthesia Type: General.     - Airway: ETT              Consents    Anesthesia Plan(s) and associated risks, benefits, and realistic alternatives discussed. Questions answered and patient/representative(s) expressed understanding.     - Discussed: Risks, Benefits and Alternatives for the PROCEDURE were discussed     - Discussed with:  Patient      - Extended Intubation/Ventilatory Support Discussed: No.      - Patient is DNR/DNI Status: No    Use of blood products discussed: No .     Postoperative Care    Pain management: Multi-modal analgesia.        Comments:                Ana M Ta MD

## 2022-08-16 NOTE — PHARMACY-ADMISSION MEDICATION HISTORY
Pharmacy Note - Admission Medication History    Pertinent Provider Information: N/A   ______________________________________________________________________    Prior To Admission (PTA) med list completed and updated in EMR.       PTA Med List   Medication Sig Last Dose     albuterol (PROVENTIL HFA;VENTOLIN HFA) 90 mcg/actuation inhaler [ALBUTEROL (PROVENTIL HFA;VENTOLIN HFA) 90 MCG/ACTUATION INHALER] Inhale 2 puffs every 6 (six) hours as needed for wheezing. Unknown at has with     ferrous sulfate (FE TABS) 325 (65 Fe) MG EC tablet Take 325 mg by mouth 2 times daily 8/15/2022 at Unknown time     hydrOXYzine (VISTARIL) 25 MG capsule Take 25-50 mg by mouth 4 times daily as needed for itching 8/16/2022 at AM     ibuprofen (ADVIL,MOTRIN) 600 MG tablet Take 600 mg by mouth every 6 hours as needed Unknown at Unknown time     loratadine-pseudoephedrine (CLARITIN-D 12 HOUR) 5-120 mg Tb12 Take 1 tablet by mouth 2 times daily as needed for allergies Unknown at Unknown time     MAGNESIUM CITRATE PO Take 1 chew tab by mouth daily as needed (muscle cramps) Unknown at Unknown time     minocycline (MINOCIN) 100 MG capsule Take 100 mg by mouth 2 times daily Past Week at Unknown time     pimecrolimus (ELIDEL) 1 % cream Apply 1 Application topically 2 times daily as needed Unknown at Unknown time     temazepam (RESTORIL) 15 mg capsule Take 15 mg by mouth nightly as needed for sleep Unknown at Unknown time     vitamin B complex with vitamin C (STRESS TAB) tablet Take 1 tablet by mouth daily 8/15/2022 at Unknown time       Information source(s): Patient and Clinic records    Method of interview communication: in-person    Patient was asked about OTC/herbal products specifically.  PTA med list reflects this.    Based on the pharmacist's assessment, the PTA med list information appears reliable    Allergies were reviewed, assessed, and updated with the patient.      Medications available for use during hospital stay: albuterol.       Thank you for the opportunity to participate in the care of this patient.      Angel Oliveira, [facility]     8/16/2022     2:38 PM

## 2022-08-16 NOTE — PROCEDURES
POST OPERATIVE NOTE  Preoperative Diagnosis:  Adnexal mass [N94.89]  Postmenopausal bleeding  Thickened endometrial stripe    Postoperative Diagnosis:  Endometrioid adenocarcinoma of the endometrium  Endometrioid adenocarcinoma of the right ovary    CLINICAL STAGE: T: 1  N: x M: x   FIGO: 1    NATIONAL GUIDELINE REFERENCED FOR TREATMENT PLANNING:NCCN    Procedures:  Robotic assisted laparoscopic total hysterectomy  Bilateral salphingoophorectomy  Omentectomy  Evacuation of abdominal ascites    Prosthetic Devices:  None    Surgeon(s) and Assistants (if any):  Surgeon(s):  Ruth Rivera MD Barkman, Alyssa Jean, PA-C  Circulator: Angela Zeng RN; Nika Velez RN  Relief Scrub: Devika Lee  Scrub Person: Dillan Morris    Anesthesia:  General with Block    Drains:  none    Specimens: Uterus, cervix, left fallopian tube and ovary for frozen section analysis, ovarian biopsy for frozen section analysis, right ovary and fallopian tube for routine, omentum for routine, 2.25 L abdominal ascites    Complications: none    Findings/Conclusions:  Normal peritoneal cavity.  2.25 L abdominal ascites, straw-colored.  Normal bilateral diaphragms.  Liver with obvious cirrhotic change.  Normal appearing stomach and omentum without nodularity. The bowel, intestines were both within normal limits. The appendix was not visualized.  The right ovary was substantially enlarged and multicystic.  It was freely mobile and not adherent to any surrounding structures.  There was no evidence for torsion.  The uterus, cervix, left fallopian tube and ovary were grossly within normal limits.  Frozen section analysis was significant for a FIGO grade 1 endometrioid adenocarcinoma of the endometrium in addition to a FIGO grade 1 endometrioid adenocarcinoma of the right ovary.      Procedures:   Radha Marcus is a very pleasant 52-year-old female with a medical history significant for alcoholic cirrhosis who presented with an 18 cm  multicystic right adnexal mass in addition to significant endometrial thickening and postmenopausal bleeding.  Her preoperative  tumor marker was markedly elevated at over 2000 units/mL.  CT scan imaging did not identify any evidence of metastatic disease including no lymphadenopathy but a small amount of ascites.  She was counseled as to the planned procedure and this was again reviewed in the preoperative area where the consent was signed.  She was brought to the operating room where general anesthesia was found to be adequate. She was prepared and draped in the normal sterile fashion in lithotomy positioning. Her arms were padded and tucked at her sides with arm boards.      A Vela catheter was placed.  The medium V-care uterine manipulator was placed without difficulty. The suture was tied to the uterine manipulator. Attention was then turned to the abdomen.      A supraumbilical incision was made roughly 27cm above the pubic symphysis. The 5 mm laparoscopic camera was introduced into the abdomen with a robotic 8 mm obturator and sleeve. Following confirmation of entrance into the abdomen by visual inspection of the intraperitoneal space, the abdomen was insufflated to 15 mm Hg. The patient was placed in steep trendelenberg. Additional port sites were mapped out 11 cm lateral with a 15 degree drop toeach side of the camera port site. The right lower quadrant port site for assistant manipulation was placed 2 cm cephalad and medial to the ASIS. The assist port site was an 8 mm Airseal trochar. The left lower quadrant port site was mapped out an additional 11 cm lateral to the mid-left port site. A survey of the abdomen and pelvis was performed.   2 L of abdominal straw-colored ascites was evacuated at this time.  The 30 degree robotic scope was utilized. The robot was then docked.      The instruments were all cannulated. The right round ligament was held on tension and transected with monopolar energy. The  peritoneum along the right pelvic sidewall was developed by developing the peritoneum just lateral and parallel to the gonadal vessels. The para-rectal space was developed between the ureter and the internal iliac artery. The medial leaf of the broad ligament was then developed, and the ureter was visualized along the medial posterior aspect. The gray space bound by the IP ligament, the utero-ovarian ligament and the ureter was opened sharply.  The utero-ovarian pedicle was then coagulated, sealed and transected.  The mass was rolled cephalad enabling further delineation of the gonadal vessels on the patient's right.  The gonadal vessels were then held directly anterior, and following were skeletonized, coagulated, sealed and transected with the robotic LigaSure sealing device.  The ovarian mass was allowed to float into the upper abdomen.  The posterior leaf was taken down to the uterosacral ligament on the right. Attention was turned to the left pelvic sidewall. In a similar fashion, round ligament and left pelvic peritoneum were similarly opened and developed. The round ligament was held on tension and transected with monopolar energy. The para-rectal space was opened and developed with the same boundaries. The gray space was similarly developed by holding the gonadal vessels on stretch anteriorly and opening the gray space with the boundaries described above. The gonadal vessels were similarly skeletonized, coagulated, sealed and transected. The posterior leaf was similarly dissected to the left uterosacral ligament. The anterior leaf was dissected to initiate the bladder flap by transecting the vesicouterine peritoneum. The pubocervical fascia was developed over the anterior cervix to fully expose the anterior colpotomy ring. Attention was turned to the uterine vessels bilaterally. These were skeletonized, coagulated with the bipolar forceps and transected with the monopolar radha. The cardinal ligament was  then coagulated by holding the bipolar forceps parallel to the uterine cervix, and secondarily dissected away from the lateral cervix with the monopolar radha. Attention was turned posterior, and the colopotomy inititiated. The colpotomy was then circumferentially completed.  The uterus, cervix, left fallopian tube and ovary were delivered vaginally and sent for frozen section analysis. The right ovary and fallopian tube were placed within a 25 cm endocatch bag and delivered vaginally.  A portion of the right ovary was sent as a right ovarian biopsy for frozen section analysis. The right fallopian tube and ovary were sent for routine processing.  Frozen section analysis did return with an endometrial carcinoma involving the endometrium and a secondary endometrioid adenocarcinoma involving the right ovary.    The omental biopsy was undertaken by grasping the omentum and pulling it inferior to the pelvis. Intervening windows between the omental vessels were developed with the robotic vessel sealing device and the omental vessels at the mid-right omentum coagulated, sealed and transected. Following, the gastroepiploic vessels on the hepatic aspect of the omentum were coagulated, sealed and transected. The loose, areolar adhesions to the transverse colon were dissected and transected with the robotic vessel sealing device.  The intervening omental vessels were sealed and transected with the robotic vessel sealing device in the direction of the splenic flexure. The gastro-epiploic vessels on the splenic aspect were sealed and transected. The omental biopsy was then delivered transvaginally.    Due to the patient's underlying liver disease and strong concern for potential lack of clotting ability, the decision was made not to proceed with lymphadenectomy.     The vagina was then closed with a Stratafix 0 PDS barbed unidirectional suture. The pelvis was irrigated and all surgical sites were irrigated and found to be  hemostatic.  The hemostatic agent, Surgicel snow was placed within the pelvis and Surgicel powder was placed within the upper abdomen.  At the completion, all robotic instruments were removed from the patient's abdomen, and the robot was un-docked. The skin incisions were re-approximated with a 3-0 subcuticular stitch followed by an overlying layer of dermabond.      The vagina was inspected and found to be hemostatic. All sponges, needles and instrument counts were correct x2. She was taken to the recovery room in a stable condition.      Priyanka Ribeiro PA-C  assisted me throughout the case and was paramount in the completion of all vital portions. She assisted with placement of the uterine manipulator, retraction, adequate visualization and closure.       Estimated Blood Loss: 30 mL    Condition on discharge from OR:  Satisfactory      Ruth Christine MD   On Behalf of  Surgeon(s):  Ruth Rivera MD Barkman, Priyanka Pearl PA-C

## 2022-08-16 NOTE — ANESTHESIA PROCEDURE NOTES
Airway       Patient location during procedure: OR       Procedure Start/Stop Times: 8/16/2022 5:11 PM and 8/16/2022 5:13 PM  Staff -        Anesthesiologist:  Ana M Ta MD       CRNA: Regina Valdez APRN CRNA       Performed By: ÁNGEL  Consent for Airway        Urgency: elective  Indications and Patient Condition       Indications for airway management: alexa-procedural       Induction type:intravenous       Mask difficulty assessment: 1 - vent by mask    Final Airway Details       Final airway type: endotracheal airway       Successful airway: ETT - single  Endotracheal Airway Details        ETT size (mm): 7.0       Cuffed: yes       Successful intubation technique: direct laryngoscopy       DL Blade Type: Nicole 2       Grade View of Cords: 1       Adjucts: stylet and tooth guard       Position: Right       Measured from: lips       Secured at (cm): 22       Bite block used: None    Post intubation assessment        Placement verified by: capnometry, equal breath sounds and chest rise        Number of attempts at approach: 1       Number of other approaches attempted: 0       Secured with: silk tape       Ease of procedure: easy       Dentition: Intact and Unchanged    Medication(s) Administered   Medication Administration Time: 8/16/2022 5:11 PM    Additional Comments       Intubated by Orquidea NEAL

## 2022-08-17 LAB
ANION GAP SERPL CALCULATED.3IONS-SCNC: 4 MMOL/L (ref 5–18)
BUN SERPL-MCNC: 8 MG/DL (ref 8–22)
CALCIUM SERPL-MCNC: 7.9 MG/DL (ref 8.5–10.5)
CHLORIDE BLD-SCNC: 106 MMOL/L (ref 98–107)
CO2 SERPL-SCNC: 25 MMOL/L (ref 22–31)
CREAT SERPL-MCNC: 0.58 MG/DL (ref 0.6–1.1)
GFR SERPL CREATININE-BSD FRML MDRD: >90 ML/MIN/1.73M2
GLUCOSE BLD-MCNC: 143 MG/DL (ref 70–125)
HGB BLD-MCNC: 8.5 G/DL (ref 11.7–15.7)
POTASSIUM BLD-SCNC: 4.7 MMOL/L (ref 3.5–5)
SODIUM SERPL-SCNC: 135 MMOL/L (ref 136–145)

## 2022-08-17 PROCEDURE — 36415 COLL VENOUS BLD VENIPUNCTURE: CPT | Performed by: PHYSICIAN ASSISTANT

## 2022-08-17 PROCEDURE — 80048 BASIC METABOLIC PNL TOTAL CA: CPT | Performed by: PHYSICIAN ASSISTANT

## 2022-08-17 PROCEDURE — 85018 HEMOGLOBIN: CPT | Performed by: PHYSICIAN ASSISTANT

## 2022-08-17 PROCEDURE — 250N000013 HC RX MED GY IP 250 OP 250 PS 637: Performed by: OBSTETRICS & GYNECOLOGY

## 2022-08-17 PROCEDURE — 250N000013 HC RX MED GY IP 250 OP 250 PS 637: Performed by: PHYSICIAN ASSISTANT

## 2022-08-17 RX ORDER — OXYCODONE HYDROCHLORIDE 5 MG/1
5-10 TABLET ORAL EVERY 4 HOURS PRN
Qty: 15 TABLET | Refills: 0 | Status: SHIPPED | OUTPATIENT
Start: 2022-08-17

## 2022-08-17 RX ORDER — AMOXICILLIN 250 MG
1 CAPSULE ORAL DAILY PRN
Status: DISCONTINUED | OUTPATIENT
Start: 2022-08-17 | End: 2022-08-18 | Stop reason: HOSPADM

## 2022-08-17 RX ORDER — AMOXICILLIN 250 MG
1 CAPSULE ORAL 2 TIMES DAILY PRN
COMMUNITY
Start: 2022-08-17

## 2022-08-17 RX ORDER — HYDROXYZINE HYDROCHLORIDE 25 MG/1
25-50 TABLET, FILM COATED ORAL 4 TIMES DAILY PRN
Status: DISCONTINUED | OUTPATIENT
Start: 2022-08-17 | End: 2022-08-18 | Stop reason: HOSPADM

## 2022-08-17 RX ADMIN — HYDROXYZINE HYDROCHLORIDE 50 MG: 25 TABLET ORAL at 06:40

## 2022-08-17 RX ADMIN — OXYCODONE HYDROCHLORIDE 5 MG: 5 TABLET ORAL at 08:07

## 2022-08-17 RX ADMIN — OXYCODONE HYDROCHLORIDE 5 MG: 5 TABLET ORAL at 03:44

## 2022-08-17 RX ADMIN — OXYCODONE HYDROCHLORIDE 5 MG: 5 TABLET ORAL at 13:23

## 2022-08-17 RX ADMIN — OXYCODONE HYDROCHLORIDE 5 MG: 5 TABLET ORAL at 20:21

## 2022-08-17 RX ADMIN — SENNOSIDES AND DOCUSATE SODIUM 1 TABLET: 50; 8.6 TABLET ORAL at 16:45

## 2022-08-17 RX ADMIN — HYDROXYZINE HYDROCHLORIDE 50 MG: 25 TABLET ORAL at 20:21

## 2022-08-17 ASSESSMENT — ACTIVITIES OF DAILY LIVING (ADL)
ADLS_ACUITY_SCORE: 37

## 2022-08-17 NOTE — PLAN OF CARE
PRIMARY DIAGNOSIS: ACUTE PAIN  OUTPATIENT/OBSERVATION GOALS TO BE MET BEFORE DISCHARGE:  1. Pain Status: Improved-controlled with oral pain medications.    2. Return to near baseline physical activity: Yes    3. Cleared for discharge by consultants (if involved): No    Discharge Planner Nurse   Safe discharge environment identified: Yes  Barriers to discharge: Yes       Entered by: Julian Hogue RN 08/17/2022 4:48 PM     Please review provider order for any additional goals.   Nurse to notify provider when observation goals have been met and patient is ready for discharge.Goal Outcome Evaluation:

## 2022-08-17 NOTE — ANESTHESIA POSTPROCEDURE EVALUATION
Patient: Radha Marcus    Procedure: Procedure(s):  DIAGNOSTIC LAPAROSCOPY  BILATERAL SALPINGO-OOPHORECTOMY, OMENTECTOMY, EVACUATION OF ABDOMINAL ASCITES  ROBOTIC ASSISTED TOTAL HYSTERECTOMY       Anesthesia Type:  General    Note:  Disposition: Inpatient   Postop Pain Control: Uneventful            Sign Out: Well controlled pain   PONV: No   Neuro/Psych: Uneventful            Sign Out: Acceptable/Baseline neuro status   Airway/Respiratory: Uneventful            Sign Out: Acceptable/Baseline resp. status   CV/Hemodynamics: Uneventful            Sign Out: Acceptable CV status; No obvious hypovolemia; No obvious fluid overload   Other NRE: NONE   DID A NON-ROUTINE EVENT OCCUR? No           Last vitals:  Vitals Value Taken Time   /56 08/16/22 1930   Temp 36.9  C (98.5  F) 08/16/22 1910   Pulse 95 08/16/22 1944   Resp 14 08/16/22 1944   SpO2 97 % 08/16/22 1944   Vitals shown include unvalidated device data.    Electronically Signed By: Owen Santos MD  August 16, 2022  7:45 PM

## 2022-08-17 NOTE — DISCHARGE SUMMARY
"HOSPITAL DISCHARGE SUMMARY    Patient Name: Radha Marcus  YOB: 1970 Age: 52 year old  Medical Record Number: [id number]  Primary Physician: Larry Vazquez  Phone: [phone]  Admission Date: 8/16/2022  Discharge Date: 8/18/22    Radha Marcus  will be discharged from [facility] to Home.    PRINCIPAL DISCHARGE DIAGNOSIS: Endometrioid adenocarcinoma of the endometrium, final pathology pending.  Endometrioid adenocarcinoma of the right ovary, final pathology pending.    BRIEF HOSPITAL COURSE: This 52 year old female admitted following the procedure stated below. She tolerated the procedure well. Uneventful post operative course and discharge to home on POD #2 after some slight difficulty with confusion, slow diet progression and struggles with voiding. Afterwards, she discharged to home with adequate pain control, tolerating orals, voiding and ambulating.    PROCEDURES PERFORMED DURING HOSPITALIZATION:   Robotic assisted laparoscopic total hysterectomy  Bilateral salpingo-oophorectomy  Omentectomy  Evacuation of abdominal ascites    COMPLICATIONS IN HOSPITAL: None    CONSULTATIONS:  None    PERTINENT FINDINGS/RESULTS AT DISCHARGE:   /58 (BP Location: Right arm)   Pulse 89   Temp 98  F (36.7  C) (Oral)   Resp 22   Ht 1.549 m (5' 1\")   Wt 70.5 kg (155 lb 6.4 oz)   SpO2 92%   BMI 29.36 kg/m      Latest Laboratory Results:  Chem:  CBC RESULTS: Recent Labs   Lab Test 08/17/22  0843   HGB 8.5*     Last Basic Metabolic Panel:  Lab Results   Component Value Date     08/17/2022      Lab Results   Component Value Date    POTASSIUM 4.7 08/17/2022     Lab Results   Component Value Date    CHLORIDE 106 08/17/2022     Lab Results   Component Value Date    LUIS 7.9 08/17/2022     Lab Results   Component Value Date    CO2 25 08/17/2022     Lab Results   Component Value Date    BUN 8 08/17/2022     Lab Results   Component Value Date    CR 0.58 08/17/2022     Lab Results   Component Value " Date     08/17/2022         IMPORTANT PENDING TEST RESULTS:  Pathology    CONDITION AT DISCHARGE:    Stabilized    DISCHARGE ORDERS  Current Discharge Medication List      START taking these medications    Details   oxyCODONE (ROXICODONE) 5 MG tablet Take 1-2 tablets (5-10 mg) by mouth every 4 hours as needed for breakthrough pain  Qty: 15 tablet, Refills: 0    Associated Diagnoses: Pelvic mass      senna-docusate (SENOKOT-S/PERICOLACE) 8.6-50 MG tablet Take 1 tablet by mouth 2 times daily as needed for constipation    Associated Diagnoses: Pelvic mass         CONTINUE these medications which have NOT CHANGED    Details   albuterol (PROVENTIL HFA;VENTOLIN HFA) 90 mcg/actuation inhaler [ALBUTEROL (PROVENTIL HFA;VENTOLIN HFA) 90 MCG/ACTUATION INHALER] Inhale 2 puffs every 6 (six) hours as needed for wheezing.      ferrous sulfate (FE TABS) 325 (65 Fe) MG EC tablet Take 325 mg by mouth 2 times daily      hydrOXYzine (VISTARIL) 25 MG capsule Take 25-50 mg by mouth 4 times daily as needed for itching      ibuprofen (ADVIL,MOTRIN) 600 MG tablet Take 600 mg by mouth every 6 hours as needed      loratadine-pseudoephedrine (CLARITIN-D 12 HOUR) 5-120 mg Tb12 Take 1 tablet by mouth 2 times daily as needed for allergies      MAGNESIUM CITRATE PO Take 1 chew tab by mouth daily as needed (muscle cramps)      minocycline (MINOCIN) 100 MG capsule Take 100 mg by mouth 2 times daily      pimecrolimus (ELIDEL) 1 % cream Apply 1 Application topically 2 times daily as needed      temazepam (RESTORIL) 15 mg capsule Take 15 mg by mouth nightly as needed for sleep      vitamin B complex with vitamin C (STRESS TAB) tablet Take 1 tablet by mouth daily               FOLLOW-UP: Radha Marcus should see Larry Vazquez.    Specialty follow-up: with Dr. Dyana Christine in 2-3 weeks.    AFTER HOSPITAL RECOMMENDATIONS  As above.      Physician(s) in addition to primary physician who should receive a copy:  Larry Vazquez

## 2022-08-17 NOTE — ANESTHESIA CARE TRANSFER NOTE
Patient: Radha Marcus    Procedure: Procedure(s):  DIAGNOSTIC LAPAROSCOPY  BILATERAL SALPINGO-OOPHORECTOMY, OMENTECTOMY, EVACUATION OF ABDOMINAL ASCITES  ROBOTIC ASSISTED TOTAL HYSTERECTOMY       Diagnosis: Adnexal mass [N94.89]  Diagnosis Additional Information: No value filed.    Anesthesia Type:   General     Note:    Oropharynx: oropharynx clear of all foreign objects and spontaneously breathing  Level of Consciousness: awake  Oxygen Supplementation: face mask  Level of Supplemental Oxygen (L/min / FiO2): 6  Independent Airway: airway patency satisfactory and stable  Dentition: dentition unchanged  Vital Signs Stable: post-procedure vital signs reviewed and stable  Report to RN Given: handoff report given  Patient transferred to: PACU  Comments: Pt transferred to PACU. Extubated in OR and spontaneously breathing with sufficient gas exchange. On 6L O2 via FM. No airway. VSS. Normothermic. Report to RN at bedside.   KATHARINA Doip CRNA     Handoff Report: Identifed the Patient, Identified the Reponsible Provider, Reviewed the pertinent medical history, Discussed the surgical course, Reviewed Intra-OP anesthesia mangement and issues during anesthesia, Set expectations for post-procedure period and Allowed opportunity for questions and acknowledgement of understanding      Vitals:  Vitals Value Taken Time   /60 08/16/22 1910   Temp 36.9  C (98.5  F) 08/16/22 1910   Pulse 93 08/16/22 1910   Resp 15 08/16/22 1910   SpO2 99 % 08/16/22 1910       Electronically Signed By: KATHARINA Ivan CRNA  August 16, 2022  7:11 PM

## 2022-08-17 NOTE — PROGRESS NOTES
"GYN/ONC PROGRESS NOTE    cc: POD# 1 s/p Dx lap, robotic TLH, BSO, OMX, evauation of abdominal ascites    HPI: Pt resting comfortably, pain is controlled with prn oxycodone. Had difficulty recalling events overnight but now is alert and oriented. Denies nausea/vomiting, but only intake today was a few bites of jello. No flatus, reports belching. Vela removed. Pt had difficulty voiding, PVR 635cc overnight, able to void a small amount but ultimately required straight cath.     EXAM:    Intake/Output Summary (Last 24 hours) at 8/17/2022 0648  Last data filed at 8/17/2022 0622  Gross per 24 hour   Intake 2420 ml   Output 2525 ml   Net -105 ml       U/O: 750cc/24hrs  Drains: N/A    /56   Pulse 99   Temp 98.3  F (36.8  C) (Oral)   Resp 16   Ht 1.549 m (5' 1\")   Wt 70.5 kg (155 lb 6.4 oz)   SpO2 92%   BMI 29.36 kg/m      GENERAL: alert, NAD, oriented  CV: RRR, no murmurs  RESPIRATORY: no dyspnea, clear bilat  ABDOMEN: moderately distended, soft, appropriately tender, incisions c/d/i, no signs of infection  EXTREMITY: b/l 1+ edema  SKIN: warm and dry, no jaundice, no rashes  NEURO: cranial nerves II-XII grossly intact    LABS  Recent Labs   Lab Test 08/16/22  1430   HGB 9.4*         IMAGING  N/A    ASSESSMENT:   Rdaha Marcus is a 52 year old female with a recent history of postmenopausal bleeding who was identified to have an 18 cm solid and cystic right ovarian mass, in addition to severe endometrial thickening. She is POD , s/p Dx lap, robotic TLH, BSO, OMX, evauation of abdominal ascites.      PLAN:  Routine postop cares, overall doing well.   Oncology:  Frozen section analysis was significant for a FIGO grade 1 endometrioid adenocarcinoma of the endometrium in addition to a FIGO grade 1 endometrioid adenocarcinoma of the right ovary. Final pathology pending.  Pain: S/p TAP blocks. Continue prn oxycodone, and IV Dilaudid available prn. Abdominal binder, ice packs. Avoid acetaminophen due to hx of alcoholic " cirrhosis.  GI: full liquids, ADAT tonight.   : lee removed, initially had difficutly voiding and elevated PVR overnight and required straight cath, montior closely over night. Discussed double voiding with patient.   FEN: DC IVF, encouraged to increase PO intake.  DVT prophylaxis: SCDs. Ambulation encouraged.  ID:  Afebrile. Monitor.  Heme: Hgb today 8.5, decreased from 9.4.  Asymptomatic. VSS.    Dispo: discharge home tomorrow if tolerating diet advancement, ambulating and pain remains adequately managed with oral pain medications.    Rossana Barth PA-C  [facility]  [facility]  [phone]

## 2022-08-17 NOTE — PLAN OF CARE
Problem: Plan of Care - These are the overarching goals to be used throughout the patient stay.    Goal: Optimal Comfort and Wellbeing  Outcome: Ongoing, Progressing     Problem: Plan of Care - These are the overarching goals to be used throughout the patient stay.    Goal: Readiness for Transition of Care  Outcome: Ongoing, Progressing   Goal Outcome Evaluation:    Pt pain managed with PRN oxycodone. Able to drink water and tolerate crackers. Up ambulating in halls x3. Bowel sounds active and burping. Having trouble emptying bladder. Voided 50ml and bladder scanned for 635ml. Tried voiding around 3 hours later and voided 175ml. Attempted to straight cath x2. Swat nurse to come and attempt.

## 2022-08-17 NOTE — PLAN OF CARE
PRIMARY DIAGNOSIS: ACUTE PAIN  OUTPATIENT/OBSERVATION GOALS TO BE MET BEFORE DISCHARGE:  1. Pain Status: Improved-controlled with oral pain medications.    2. Return to near baseline physical activity: Yes    3. Cleared for discharge by consultants (if involved): No    Discharge Planner Nurse   Safe discharge environment identified: Yes  Barriers to discharge: Yes       Entered by: Julian Hogue RN 08/17/2022 2:46 PM     Please review provider order for any additional goals.   Nurse to notify provider when observation goals have been met and patient is ready for discharge.Goal Outcome Evaluation:

## 2022-08-18 VITALS
RESPIRATION RATE: 18 BRPM | SYSTOLIC BLOOD PRESSURE: 115 MMHG | HEART RATE: 74 BPM | BODY MASS INDEX: 29.34 KG/M2 | OXYGEN SATURATION: 98 % | DIASTOLIC BLOOD PRESSURE: 65 MMHG | WEIGHT: 155.4 LBS | TEMPERATURE: 98.1 F | HEIGHT: 61 IN

## 2022-08-18 LAB
GLUCOSE BLDC GLUCOMTR-MCNC: 108 MG/DL (ref 70–99)
PATH REPORT.COMMENTS IMP SPEC: ABNORMAL
PATH REPORT.COMMENTS IMP SPEC: YES
PATH REPORT.FINAL DX SPEC: ABNORMAL
PATH REPORT.GROSS SPEC: ABNORMAL
PATH REPORT.MICROSCOPIC SPEC OTHER STN: ABNORMAL
PATH REPORT.RELEVANT HX SPEC: ABNORMAL

## 2022-08-18 PROCEDURE — 250N000013 HC RX MED GY IP 250 OP 250 PS 637: Performed by: PHYSICIAN ASSISTANT

## 2022-08-18 PROCEDURE — 82962 GLUCOSE BLOOD TEST: CPT

## 2022-08-18 RX ADMIN — OXYCODONE HYDROCHLORIDE 5 MG: 5 TABLET ORAL at 00:38

## 2022-08-18 RX ADMIN — PROCHLORPERAZINE MALEATE 10 MG: 10 TABLET ORAL at 10:21

## 2022-08-18 RX ADMIN — OXYCODONE HYDROCHLORIDE 10 MG: 5 TABLET ORAL at 10:21

## 2022-08-18 RX ADMIN — OXYCODONE HYDROCHLORIDE 5 MG: 5 TABLET ORAL at 06:19

## 2022-08-18 ASSESSMENT — ACTIVITIES OF DAILY LIVING (ADL)
ADLS_ACUITY_SCORE: 33
ADLS_ACUITY_SCORE: 37

## 2022-08-18 ASSESSMENT — COLUMBIA-SUICIDE SEVERITY RATING SCALE - C-SSRS
1. IN THE PAST MONTH, HAVE YOU WISHED YOU WERE DEAD OR WISHED YOU COULD GO TO SLEEP AND NOT WAKE UP?: NO
2. HAVE YOU ACTUALLY HAD ANY THOUGHTS OF KILLING YOURSELF IN THE PAST MONTH?: NO
3. HAVE YOU BEEN THINKING ABOUT HOW YOU MIGHT KILL YOURSELF?: NO
4. HAVE YOU HAD THESE THOUGHTS AND HAD SOME INTENTION OF ACTING ON THEM?: NO
6. HAVE YOU EVER DONE ANYTHING, STARTED TO DO ANYTHING, OR PREPARED TO DO ANYTHING TO END YOUR LIFE?: NO
5. HAVE YOU STARTED TO WORK OUT OR WORKED OUT THE DETAILS OF HOW TO KILL YOURSELF? DO YOU INTEND TO CARRY OUT THIS PLAN?: NO

## 2022-08-18 NOTE — PLAN OF CARE
Problem: Plan of Care - These are the overarching goals to be used throughout the patient stay.    Goal: Optimal Comfort and Wellbeing  Intervention: Monitor Pain and Promote Comfort  Recent Flowsheet Documentation  Taken 8/18/2022 0038 by Mirlande Wolf RN  Pain Management Interventions: medication (see MAR)     Problem: Plan of Care - These are the overarching goals to be used throughout the patient stay.    Goal: Readiness for Transition of Care  Outcome: Ongoing, Progressing   Goal Outcome Evaluation:    Pt doing well. Pain controlled with 5mg oxycodone. Tolerating regular diet. Voiding well. Now passing some flatus. Up ambulating in halls. Hoping to discharge this morning.

## 2022-08-18 NOTE — PROGRESS NOTES
"GYN/ONC PROGRESS NOTE    cc: POD# 1 s/p Dx lap, robotic TLH, BSO, OMX, evauation of abdominal ascites    HPI: Doing very well. Multiple questions about surgery and cancer. +flatus, no BM.     EXAM:    Intake/Output Summary (Last 24 hours) at 8/17/2022 0648  Last data filed at 8/17/2022 0622  Gross per 24 hour   Intake 2420 ml   Output 2525 ml   Net -105 ml       U/O: 4050 cc/24hrs  Drains: N/A    /61   Pulse 72   Temp 98.2  F (36.8  C)   Resp 18   Ht 1.549 m (5' 1\")   Wt 70.5 kg (155 lb 6.4 oz)   SpO2 96%   BMI 29.36 kg/m      GENERAL: alert, NAD, oriented  CV: RRR, no murmurs  RESPIRATORY: no dyspnea, clear bilat  ABDOMEN: moderately distended, soft, appropriately tender, incisions c/d/i, no signs of infection  EXTREMITY: b/l 1+ edema  SKIN: warm and dry, no jaundice, no rashes  NEURO: cranial nerves II-XII grossly intact    LABS  Recent Labs   Lab Test 08/16/22  1430   HGB 9.4*         IMAGING  N/A    ASSESSMENT:   Radha Marcus is a 52 year old female with a recent history of postmenopausal bleeding who was identified to have an 18 cm solid and cystic right ovarian mass, in addition to severe endometrial thickening. She is POD #2, s/p Dx lap, robotic TLH, BSO, OMX, evauation of abdominal ascites.      PLAN:  Routine postop cares, overall doing well.   Oncology:  Frozen section analysis was significant for a FIGO grade 1 endometrioid adenocarcinoma of the endometrium in addition to a FIGO grade 1 endometrioid adenocarcinoma of the right ovary. Final pathology pending.  Pain: S/p TAP blocks. Continue prn oxycodone, and IV Dilaudid available prn. Abdominal binder, ice packs. Avoid acetaminophen due to hx of alcoholic cirrhosis.  GI: regular diet  : voiding  FEN: regular diet  DVT prophylaxis: SCDs. Ambulation encouraged.  ID:  Afebrile. Monitor.  Heme: Hgb today 8.5, decreased from 9.4.  Asymptomatic. VSS.    Dispo: discharge to home.     Ruth Christine MD  Gynecologic Oncology  [place] [facility]  [facility]: [phone]

## 2022-08-18 NOTE — PLAN OF CARE
Pain controlled with PO PRN medications. Up independently. Voiding without issues. Passing gas, active BS.    Surgeon cleared pt for discharge. Discharge instructions given to patient.

## 2023-02-23 LAB
PATH REPORT.ADDENDUM SPEC: ABNORMAL
PATH REPORT.COMMENTS IMP SPEC: ABNORMAL
PATH REPORT.COMMENTS IMP SPEC: YES
PATH REPORT.FINAL DX SPEC: ABNORMAL
PATH REPORT.GROSS SPEC: ABNORMAL
PATH REPORT.INTRAOP OBS SPEC DOC: ABNORMAL
PATH REPORT.MICROSCOPIC SPEC OTHER STN: ABNORMAL
PATH REPORT.RELEVANT HX SPEC: ABNORMAL
PATHOLOGY SYNOPTIC REPORT: ABNORMAL
PHOTO IMAGE: ABNORMAL

## (undated) DEVICE — SYR 10ML FINGER CONTROL W/O NDL 309695

## (undated) DEVICE — DRAPE SHEET TABLE COVER KC 42301*

## (undated) DEVICE — TROCAR PORT BLADELESS 5X100MM IAS5-100LP

## (undated) DEVICE — DRAPE IOBAN INCISE 23X17" 6650EZ

## (undated) DEVICE — GLOVE BIOGEL PI ULTRATOUCH G SZ 7.0 42170

## (undated) DEVICE — TUBING LAP SUCT/IRRIG STRYKER 250070500

## (undated) DEVICE — GLOVE BIOGEL PI ULTRATOUCH G SZ 6.5 42165

## (undated) DEVICE — COVER LIGHT HANDLE STRL SGL USE AM3612

## (undated) DEVICE — PAD POS XL 1X20X40IN PINK PIGAZZI

## (undated) DEVICE — LEGGINGS 31X48" LF KM89408

## (undated) DEVICE — ENDO TROCAR SLEEVE KII Z-THREADED 05X100MM CTS02

## (undated) DEVICE — SOL WATER IRRIG 1000ML BOTTLE 2F7114

## (undated) DEVICE — DECANTER VIAL 2006S

## (undated) DEVICE — CATH FOLEY 5CC 16FR SIL/LTX 0165V16S

## (undated) DEVICE — ENDO TROCAR FIRST ENTRY KII FIOS Z-THRD 11X100MM CTF33

## (undated) DEVICE — SOL NACL 0.9% INJ 1000ML BAG 2B1324X

## (undated) DEVICE — ENDO OBTURATOR ACCESS PORT BLADELESS 8X100MM IAS8-100LP

## (undated) DEVICE — DAVINCI XI HANDPIECE ESU VESSEL SEALER 8MM EXT 480422

## (undated) DEVICE — PROTECTOR ARM STANDARD ONE STEP

## (undated) DEVICE — DRAPE LAP/CHOLE W/O POUCH 89225

## (undated) DEVICE — TRAP SPECIMAN 5CC-40CC DYND44140

## (undated) DEVICE — APPLICATOR ENDOSCOPIC 5 SURGICEL POWDER 3123SPEA

## (undated) DEVICE — ADH SKIN CLOSURE PREMIERPRO EXOFIN 1.0ML 3470

## (undated) DEVICE — DRAPE U SPLIT 74X120" 29440

## (undated) DEVICE — SURGICEL ABSORBABLE HEMOSTAT SNOW 4"X4" 2083

## (undated) DEVICE — TRENGUARD 450 PROCEDURAL PACK 111404

## (undated) DEVICE — GLOVE UNDER INDICATOR PI SZ 7.0 LF 41670

## (undated) DEVICE — SUCTION TIP YANKAUER FLEX ORTHO K62

## (undated) DEVICE — SUCTION MANIFOLD NEPTUNE 2 SYS 1 PORT 702-025-000

## (undated) DEVICE — SURGICEL POWDER ABSORBABLE HEMOSTAT 3GM 3013SP

## (undated) DEVICE — DRAPE POUCH INSTRUMENT 3 POCKET 1018L

## (undated) DEVICE — CUSTOM PACK DA VINCI GYN SMA5BDVHEA

## (undated) DEVICE — TUBING FILTER TRI-LUMEN AIRSEAL ASC-EVAC1

## (undated) DEVICE — PREP CHLORAPREP 26ML TINTED HI-LITE ORANGE 930815

## (undated) DEVICE — BLADE KNIFE SURG 11 371111

## (undated) DEVICE — MAT FLOOR SURGICAL 40X38 0702140238

## (undated) DEVICE — GOWN IMPERVIOUS BREATHABLE SMART LG 89015

## (undated) DEVICE — NEEDLE HYPO 22X1-1/2 SAFETY 305900

## (undated) DEVICE — PREP SCRUB SOL EXIDINE 4% CHG 4OZ 29002-404

## (undated) DEVICE — TUBING SUCTION MEDI-VAC SOFT 3/16"X20' N520A

## (undated) DEVICE — SYSTEM LAPAROVUE VISIBILITY LAPVUE10

## (undated) RX ORDER — DEXAMETHASONE SODIUM PHOSPHATE 10 MG/ML
INJECTION INTRAMUSCULAR; INTRAVENOUS
Status: DISPENSED
Start: 2022-08-16

## (undated) RX ORDER — GLYCOPYRROLATE 0.2 MG/ML
INJECTION INTRAMUSCULAR; INTRAVENOUS
Status: DISPENSED
Start: 2022-08-16

## (undated) RX ORDER — KETAMINE HYDROCHLORIDE 10 MG/ML
INJECTION INTRAMUSCULAR; INTRAVENOUS
Status: DISPENSED
Start: 2022-08-16

## (undated) RX ORDER — EPHEDRINE SULFATE 50 MG/ML
INJECTION, SOLUTION INTRAMUSCULAR; INTRAVENOUS; SUBCUTANEOUS
Status: DISPENSED
Start: 2022-08-16

## (undated) RX ORDER — VASOPRESSIN 20 U/ML
INJECTION PARENTERAL
Status: DISPENSED
Start: 2022-08-16

## (undated) RX ORDER — PROPOFOL 10 MG/ML
INJECTION, EMULSION INTRAVENOUS
Status: DISPENSED
Start: 2022-08-16

## (undated) RX ORDER — BUPIVACAINE HYDROCHLORIDE 5 MG/ML
INJECTION, SOLUTION EPIDURAL; INTRACAUDAL
Status: DISPENSED
Start: 2022-08-16

## (undated) RX ORDER — FENTANYL CITRATE 50 UG/ML
INJECTION, SOLUTION INTRAMUSCULAR; INTRAVENOUS
Status: DISPENSED
Start: 2022-08-16

## (undated) RX ORDER — LIDOCAINE HYDROCHLORIDE 10 MG/ML
INJECTION, SOLUTION EPIDURAL; INFILTRATION; INTRACAUDAL; PERINEURAL
Status: DISPENSED
Start: 2022-08-16

## (undated) RX ORDER — FENTANYL CITRATE-0.9 % NACL/PF 10 MCG/ML
PLASTIC BAG, INJECTION (ML) INTRAVENOUS
Status: DISPENSED
Start: 2022-08-16

## (undated) RX ORDER — ONDANSETRON 2 MG/ML
INJECTION INTRAMUSCULAR; INTRAVENOUS
Status: DISPENSED
Start: 2022-08-16